# Patient Record
Sex: MALE | Race: BLACK OR AFRICAN AMERICAN | NOT HISPANIC OR LATINO | Employment: FULL TIME | URBAN - METROPOLITAN AREA
[De-identification: names, ages, dates, MRNs, and addresses within clinical notes are randomized per-mention and may not be internally consistent; named-entity substitution may affect disease eponyms.]

---

## 2019-12-17 ENCOUNTER — HOSPITAL ENCOUNTER (EMERGENCY)
Facility: HOSPITAL | Age: 24
Discharge: HOME | End: 2019-12-17
Attending: EMERGENCY MEDICINE | Admitting: EMERGENCY MEDICINE

## 2019-12-17 VITALS
WEIGHT: 190 LBS | OXYGEN SATURATION: 100 % | TEMPERATURE: 98.1 F | HEIGHT: 72 IN | SYSTOLIC BLOOD PRESSURE: 175 MMHG | HEART RATE: 112 BPM | RESPIRATION RATE: 18 BRPM | DIASTOLIC BLOOD PRESSURE: 117 MMHG | BODY MASS INDEX: 25.73 KG/M2

## 2019-12-17 DIAGNOSIS — I16.0 HYPERTENSIVE URGENCY: Primary | ICD-10-CM

## 2019-12-17 PROCEDURE — 99283 EMERGENCY DEPT VISIT LOW MDM: CPT

## 2019-12-17 PROCEDURE — 93005 ELECTROCARDIOGRAM TRACING: CPT | Performed by: EMERGENCY MEDICINE

## 2019-12-17 RX ORDER — HYDROCHLOROTHIAZIDE 12.5 MG/1
12.5 TABLET ORAL DAILY
Qty: 8 TABLET | Refills: 0 | Status: SHIPPED | OUTPATIENT
Start: 2019-12-17 | End: 2021-01-23 | Stop reason: SDUPTHER

## 2019-12-17 RX ORDER — CLONIDINE HYDROCHLORIDE 0.1 MG/1
0.1 TABLET ORAL ONCE
Status: COMPLETED | OUTPATIENT
Start: 2019-12-17 | End: 2019-12-17

## 2019-12-17 RX ADMIN — CLONIDINE HYDROCHLORIDE 0.1 MG: 0.1 TABLET ORAL at 05:21

## 2019-12-18 NOTE — ED PROVIDER NOTES
Subjective   Pt is a healthy 25 yo male who presents with hypertension and generalized weakness.  He states that he was feeling generally weak thorughout the day and when he took for blood pressure it was elevated.  He states that he has been very busy lately between woek and school.  He had six finals last week in addition to working every night.  He states that he has a history of hypertension but has not taken any medication for this in the recent past.  He deneis fever, chills, nausea, vomiting, chest pain, SOA, or other acute complaints.      Weakness - Generalized   Severity:  Mild  Onset quality:  Gradual  Timing:  Constant  Progression:  Resolved  Chronicity:  New  Context: decreased sleep and stress    Relieved by:  Nothing  Worsened by:  Nothing  Ineffective treatments:  None tried  Associated symptoms: no abdominal pain, no chest pain, no cough, no fever, no foul-smelling urine, no headaches, no loss of consciousness and no vomiting        Review of Systems   Constitutional: Negative for fever.   Respiratory: Negative for cough.    Cardiovascular: Negative for chest pain.   Gastrointestinal: Negative for abdominal pain and vomiting.   Neurological: Negative for loss of consciousness and headaches.   All other systems reviewed and are negative.      No past medical history on file.    No Known Allergies    No past surgical history on file.    No family history on file.    Social History     Socioeconomic History   • Marital status: Single     Spouse name: Not on file   • Number of children: Not on file   • Years of education: Not on file   • Highest education level: Not on file           Objective   Physical Exam   Constitutional: He is oriented to person, place, and time. He appears well-developed and well-nourished. No distress.   HENT:   Head: Normocephalic and atraumatic.   Eyes: Pupils are equal, round, and reactive to light. Conjunctivae and EOM are normal.   Neck: Normal range of motion.  "  Cardiovascular: Normal rate, regular rhythm and normal heart sounds. Exam reveals no gallop and no friction rub.   No murmur heard.  Pulmonary/Chest: Effort normal and breath sounds normal. No respiratory distress.   Abdominal: Soft. Bowel sounds are normal. There is no tenderness.   Musculoskeletal: Normal range of motion.   Neurological: He is alert and oriented to person, place, and time.   Skin: Skin is warm and dry. Capillary refill takes less than 2 seconds.   Psychiatric: He has a normal mood and affect.   Nursing note and vitals reviewed.      Procedures           ED Course                      No data recorded              No results found for this or any previous visit (from the past 24 hour(s)).  Note: In addition to lab results from this visit, the labs listed above may include labs taken at another facility or during a different encounter within the last 24 hours. Please correlate lab times with ED admission and discharge times for further clarification of the services performed during this visit.    No orders to display     Vitals:    12/17/19 0304 12/17/19 0520   BP: (!) 170/111 (!) 175/117   BP Location: Right arm Right arm   Patient Position: Sitting Sitting   Pulse: 112    Resp: 18    Temp: 98.1 °F (36.7 °C)    TempSrc: Oral    SpO2: 100%    Weight: 86.2 kg (190 lb)    Height: 182.9 cm (72\")      Medications   cloNIDine (CATAPRES) tablet 0.1 mg (0.1 mg Oral Given 12/17/19 0521)     ECG/EMG Results (last 24 hours)     ** No results found for the last 24 hours. **        ECG 12 Lead             Pt declined any testing and requests medication for the HTN.  He understands that without testing we are unable to further evaluation his weakness or injury from HTN.  He Is adamant that he does not desire a workup at this time.  He will establish care with PCP and follow up at soonest availability.           MDM    Final diagnoses:   Hypertensive urgency              Lawrence Collazo, DO  12/18/19 0148    "

## 2021-01-23 ENCOUNTER — HOSPITAL ENCOUNTER (EMERGENCY)
Facility: HOSPITAL | Age: 26
Discharge: HOME | End: 2021-01-23
Attending: EMERGENCY MEDICINE | Admitting: EMERGENCY MEDICINE

## 2021-01-23 VITALS
SYSTOLIC BLOOD PRESSURE: 131 MMHG | DIASTOLIC BLOOD PRESSURE: 90 MMHG | OXYGEN SATURATION: 100 % | HEIGHT: 72 IN | HEART RATE: 74 BPM | WEIGHT: 170 LBS | TEMPERATURE: 98.5 F | BODY MASS INDEX: 23.03 KG/M2 | RESPIRATION RATE: 16 BRPM

## 2021-01-23 DIAGNOSIS — R20.2 PARESTHESIA OF UPPER AND LOWER EXTREMITIES OF BOTH SIDES: Primary | ICD-10-CM

## 2021-01-23 LAB
ANION GAP SERPL CALCULATED.3IONS-SCNC: 6 MMOL/L (ref 5–15)
BASOPHILS # BLD AUTO: 0.02 10*3/MM3 (ref 0–0.2)
BASOPHILS NFR BLD AUTO: 0.4 % (ref 0–1.5)
BUN SERPL-MCNC: 10 MG/DL (ref 6–20)
BUN/CREAT SERPL: 10.2 (ref 7–25)
CALCIUM SPEC-SCNC: 9.2 MG/DL (ref 8.6–10.5)
CHLORIDE SERPL-SCNC: 103 MMOL/L (ref 98–107)
CO2 SERPL-SCNC: 29 MMOL/L (ref 22–29)
CREAT SERPL-MCNC: 0.98 MG/DL (ref 0.76–1.27)
DEPRECATED RDW RBC AUTO: 46.2 FL (ref 37–54)
EOSINOPHIL # BLD AUTO: 0.01 10*3/MM3 (ref 0–0.4)
EOSINOPHIL NFR BLD AUTO: 0.2 % (ref 0.3–6.2)
ERYTHROCYTE [DISTWIDTH] IN BLOOD BY AUTOMATED COUNT: 13.4 % (ref 12.3–15.4)
GFR SERPL CREATININE-BSD FRML MDRD: 113 ML/MIN/1.73
GLUCOSE SERPL-MCNC: 92 MG/DL (ref 65–99)
HCT VFR BLD AUTO: 47.4 % (ref 37.5–51)
HGB BLD-MCNC: 15.8 G/DL (ref 13–17.7)
IMM GRANULOCYTES # BLD AUTO: 0 10*3/MM3 (ref 0–0.05)
IMM GRANULOCYTES NFR BLD AUTO: 0 % (ref 0–0.5)
LYMPHOCYTES # BLD AUTO: 1.05 10*3/MM3 (ref 0.7–3.1)
LYMPHOCYTES NFR BLD AUTO: 20.8 % (ref 19.6–45.3)
MAGNESIUM SERPL-MCNC: 2.2 MG/DL (ref 1.6–2.6)
MCH RBC QN AUTO: 31.2 PG (ref 26.6–33)
MCHC RBC AUTO-ENTMCNC: 33.3 G/DL (ref 31.5–35.7)
MCV RBC AUTO: 93.7 FL (ref 79–97)
MONOCYTES # BLD AUTO: 0.33 10*3/MM3 (ref 0.1–0.9)
MONOCYTES NFR BLD AUTO: 6.5 % (ref 5–12)
NEUTROPHILS NFR BLD AUTO: 3.64 10*3/MM3 (ref 1.7–7)
NEUTROPHILS NFR BLD AUTO: 72.1 % (ref 42.7–76)
NRBC BLD AUTO-RTO: 0 /100 WBC (ref 0–0.2)
PLATELET # BLD AUTO: 189 10*3/MM3 (ref 140–450)
PMV BLD AUTO: 11.8 FL (ref 6–12)
POTASSIUM SERPL-SCNC: 4.2 MMOL/L (ref 3.5–5.2)
RBC # BLD AUTO: 5.06 10*6/MM3 (ref 4.14–5.8)
SODIUM SERPL-SCNC: 138 MMOL/L (ref 136–145)
TSH SERPL DL<=0.05 MIU/L-ACNC: 1.07 UIU/ML (ref 0.27–4.2)
WBC # BLD AUTO: 5.05 10*3/MM3 (ref 3.4–10.8)

## 2021-01-23 PROCEDURE — 84443 ASSAY THYROID STIM HORMONE: CPT | Performed by: EMERGENCY MEDICINE

## 2021-01-23 PROCEDURE — 85025 COMPLETE CBC W/AUTO DIFF WBC: CPT | Performed by: EMERGENCY MEDICINE

## 2021-01-23 PROCEDURE — 83735 ASSAY OF MAGNESIUM: CPT | Performed by: EMERGENCY MEDICINE

## 2021-01-23 PROCEDURE — 99283 EMERGENCY DEPT VISIT LOW MDM: CPT

## 2021-01-23 PROCEDURE — 80048 BASIC METABOLIC PNL TOTAL CA: CPT | Performed by: EMERGENCY MEDICINE

## 2021-01-23 RX ORDER — HYDROCHLOROTHIAZIDE 12.5 MG/1
12.5 TABLET ORAL DAILY
Qty: 8 TABLET | Refills: 0 | Status: SHIPPED | OUTPATIENT
Start: 2021-01-23

## 2021-01-23 NOTE — ED PROVIDER NOTES
Subjective   25-year-old male presents to the emergency department with complaints of mild numbness in the legs and feet and also in the hands and arms after sitting for prolonged periods of time.  The symptoms have been ongoing for about 2 days.  He also reports mild lightheadedness.  He denies any fever, cough or shortness of breath.  No loss of appetite.  No recent illness.  No vomiting or diarrhea.  No neck or back pain.  He reports a history of hypertension and states that he recently ran out of his medications.  His PCP is Dr. Magaña.  The patient works at [employer] and has been there for 6 years.  No known injury.          Review of Systems   Constitutional: Negative for appetite change, chills and fever.   HENT: Negative for sore throat.    Respiratory: Negative for cough and shortness of breath.    Cardiovascular: Negative for chest pain and palpitations.   Gastrointestinal: Negative for abdominal pain, diarrhea, nausea and vomiting.   Endocrine: Negative for polydipsia, polyphagia and polyuria.   Genitourinary: Negative for decreased urine volume and dysuria.   Musculoskeletal: Negative for back pain and neck pain.   Skin: Negative for rash.   Allergic/Immunologic: Negative for immunocompromised state.   Neurological: Positive for light-headedness (Mild) and numbness (Mild numbness in the feet and legs as well as hands and arms over the past 2 days but none currently.). Negative for dizziness, weakness and headaches.   Hematological: Negative.    Psychiatric/Behavioral: Negative.        Past Medical History:   Diagnosis Date   • Hypertension        No Known Allergies    History reviewed. No pertinent surgical history.    History reviewed. No pertinent family history.    Social History     Socioeconomic History   • Marital status: Single     Spouse name: Not on file   • Number of children: Not on file   • Years of education: Not on file   • Highest education level: Not on file   Tobacco Use   • Smoking status:  Never Smoker   • Smokeless tobacco: Never Used   Substance and Sexual Activity   • Alcohol use: Not Currently   • Drug use: Never   • Sexual activity: Defer           Objective   Physical Exam  Constitutional:       General: He is not in acute distress.     Appearance: Normal appearance.   HENT:      Head: Normocephalic.      Nose: Nose normal.      Mouth/Throat:      Mouth: Mucous membranes are moist.   Eyes:      Conjunctiva/sclera: Conjunctivae normal.      Pupils: Pupils are equal, round, and reactive to light.   Neck:      Musculoskeletal: Normal range of motion.   Cardiovascular:      Rate and Rhythm: Normal rate and regular rhythm.      Pulses: Normal pulses.      Heart sounds: No murmur.      Comments: Normal radial and pedal pulses bilaterally.  Pulmonary:      Effort: Pulmonary effort is normal.      Breath sounds: Normal breath sounds.   Abdominal:      General: Bowel sounds are normal.      Tenderness: There is no abdominal tenderness.   Musculoskeletal: Normal range of motion.         General: No swelling or tenderness.      Right lower leg: No edema.      Left lower leg: No edema.   Skin:     General: Skin is warm and dry.   Neurological:      General: No focal deficit present.      Mental Status: He is alert and oriented to person, place, and time.      Comments: Normal sensation to soft touch.  Equal  bilaterally.  No drift.  Normal leg strength.  Normal reflexes.   Psychiatric:         Mood and Affect: Mood normal.         Procedures           ED Course      The patient appears in no distress.  He states that he has no current symptoms.  No neurovascular deficits.  Normal labs.  He has good strength and pulses in all extremities.  I spoke with the patient about all available labs.  I do not think further emergent work-up is indicated at this time.  I will refer him to his PCP for further evaluation if his symptoms persist.                                     MDM    Final diagnoses:    Paresthesia of upper and lower extremities of both sides            Jun Evans, PA  01/23/21 1740

## 2023-08-13 ENCOUNTER — APPOINTMENT (OUTPATIENT)
Dept: GENERAL RADIOLOGY | Facility: HOSPITAL | Age: 28
End: 2023-08-13
Payer: COMMERCIAL

## 2023-08-13 PROCEDURE — 73130 X-RAY EXAM OF HAND: CPT

## 2023-08-13 PROCEDURE — 99283 EMERGENCY DEPT VISIT LOW MDM: CPT

## 2023-08-13 PROCEDURE — 73110 X-RAY EXAM OF WRIST: CPT

## 2023-08-13 RX ORDER — IBUPROFEN 600 MG/1
600 TABLET ORAL ONCE
Status: COMPLETED | OUTPATIENT
Start: 2023-08-13 | End: 2023-08-14

## 2023-08-13 RX ORDER — METHOCARBAMOL 500 MG/1
500 TABLET, FILM COATED ORAL ONCE
Status: COMPLETED | OUTPATIENT
Start: 2023-08-13 | End: 2023-08-14

## 2023-08-14 ENCOUNTER — APPOINTMENT (OUTPATIENT)
Dept: GENERAL RADIOLOGY | Facility: HOSPITAL | Age: 28
End: 2023-08-14
Payer: COMMERCIAL

## 2023-08-14 ENCOUNTER — HOSPITAL ENCOUNTER (EMERGENCY)
Facility: HOSPITAL | Age: 28
Discharge: HOME | End: 2023-08-14
Attending: EMERGENCY MEDICINE
Payer: COMMERCIAL

## 2023-08-14 VITALS
RESPIRATION RATE: 16 BRPM | OXYGEN SATURATION: 99 % | HEART RATE: 97 BPM | DIASTOLIC BLOOD PRESSURE: 79 MMHG | HEIGHT: 73 IN | BODY MASS INDEX: 25.18 KG/M2 | TEMPERATURE: 98.3 F | SYSTOLIC BLOOD PRESSURE: 149 MMHG | WEIGHT: 190 LBS

## 2023-08-14 DIAGNOSIS — S16.1XXA ACUTE CERVICAL MYOFASCIAL STRAIN, INITIAL ENCOUNTER: ICD-10-CM
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
## 2023-08-14 DIAGNOSIS — S60.222A CONTUSION OF LEFT HAND, INITIAL ENCOUNTER: ICD-10-CM

## 2023-08-14 DIAGNOSIS — Z86.79 HISTORY OF HYPERTENSION: ICD-10-CM

## 2023-08-14 DIAGNOSIS — S60.221A CONTUSION OF RIGHT HAND, INITIAL ENCOUNTER: ICD-10-CM

## 2023-08-14 PROCEDURE — 72040 X-RAY EXAM NECK SPINE 2-3 VW: CPT

## 2023-08-14 PROCEDURE — 73110 X-RAY EXAM OF WRIST: CPT

## 2023-08-14 PROCEDURE — 73130 X-RAY EXAM OF HAND: CPT

## 2023-08-14 RX ORDER — METHOCARBAMOL 500 MG/1
500 TABLET, FILM COATED ORAL 3 TIMES DAILY PRN
Qty: 21 TABLET | Refills: 0 | Status: SHIPPED | OUTPATIENT
Start: 2023-08-14

## 2023-08-14 RX ADMIN — METHOCARBAMOL 500 MG: 500 TABLET ORAL at 00:00

## 2023-08-14 RX ADMIN — IBUPROFEN 600 MG: 600 TABLET, FILM COATED ORAL at 00:00

## 2023-08-14 NOTE — DISCHARGE INSTRUCTIONS
ER evaluation reveals normal x-rays of the cervical spine and normal x-rays of bilateral hands and wrists.  Recommend ice as needed for swelling to the hand.  We also will give sprain/strain instructions.  Rx for diclofenac and Robaxin as directed.  Recommend close PCP follow-up for recheck within 48 hours as needed.  Return to the ER if worsening symptoms.

## 2023-08-14 NOTE — Clinical Note
[facility] EMERGENCY DEPARTMENT  [address]  [facility] [address]  Phone: [phone]    Dean Mitchell was seen and treated in our emergency department on 8/13/2023.  He may return to work on 08/16/2023.         Thank you for choosing [facility].    Maria Dolores Richard, JUNI
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
